# Patient Record
Sex: FEMALE | Race: WHITE | NOT HISPANIC OR LATINO
[De-identification: names, ages, dates, MRNs, and addresses within clinical notes are randomized per-mention and may not be internally consistent; named-entity substitution may affect disease eponyms.]

---

## 2022-03-22 PROBLEM — Z00.00 ENCOUNTER FOR PREVENTIVE HEALTH EXAMINATION: Status: ACTIVE | Noted: 2022-03-22

## 2022-03-24 ENCOUNTER — NON-APPOINTMENT (OUTPATIENT)
Age: 82
End: 2022-03-24

## 2022-03-25 ENCOUNTER — APPOINTMENT (OUTPATIENT)
Dept: HEART AND VASCULAR | Facility: CLINIC | Age: 82
End: 2022-03-25
Payer: MEDICARE

## 2022-03-25 ENCOUNTER — APPOINTMENT (OUTPATIENT)
Dept: HEART AND VASCULAR | Facility: CLINIC | Age: 82
End: 2022-03-25

## 2022-03-25 VITALS — SYSTOLIC BLOOD PRESSURE: 160 MMHG | DIASTOLIC BLOOD PRESSURE: 50 MMHG

## 2022-03-25 VITALS
HEIGHT: 64.5 IN | SYSTOLIC BLOOD PRESSURE: 200 MMHG | DIASTOLIC BLOOD PRESSURE: 90 MMHG | WEIGHT: 170 LBS | HEART RATE: 87 BPM | BODY MASS INDEX: 28.67 KG/M2 | TEMPERATURE: 97.6 F | OXYGEN SATURATION: 97 %

## 2022-03-25 DIAGNOSIS — N18.9 CHRONIC KIDNEY DISEASE, UNSPECIFIED: ICD-10-CM

## 2022-03-25 DIAGNOSIS — Z86.79 PERSONAL HISTORY OF OTHER DISEASES OF THE CIRCULATORY SYSTEM: ICD-10-CM

## 2022-03-25 DIAGNOSIS — M19.90 UNSPECIFIED OSTEOARTHRITIS, UNSPECIFIED SITE: ICD-10-CM

## 2022-03-25 DIAGNOSIS — Z84.1 FAMILY HISTORY OF DISORDERS OF KIDNEY AND URETER: ICD-10-CM

## 2022-03-25 DIAGNOSIS — Z87.891 PERSONAL HISTORY OF NICOTINE DEPENDENCE: ICD-10-CM

## 2022-03-25 DIAGNOSIS — R09.89 OTHER SPECIFIED SYMPTOMS AND SIGNS INVOLVING THE CIRCULATORY AND RESPIRATORY SYSTEMS: ICD-10-CM

## 2022-03-25 DIAGNOSIS — F41.9 ANXIETY DISORDER, UNSPECIFIED: ICD-10-CM

## 2022-03-25 DIAGNOSIS — Z82.49 FAMILY HISTORY OF ISCHEMIC HEART DISEASE AND OTHER DISEASES OF THE CIRCULATORY SYSTEM: ICD-10-CM

## 2022-03-25 DIAGNOSIS — Z72.89 OTHER PROBLEMS RELATED TO LIFESTYLE: ICD-10-CM

## 2022-03-25 DIAGNOSIS — Z78.9 OTHER SPECIFIED HEALTH STATUS: ICD-10-CM

## 2022-03-25 DIAGNOSIS — Z86.03 PERSONAL HISTORY OF NEOPLASM OF UNCERTAIN BEHAVIOR: ICD-10-CM

## 2022-03-25 PROCEDURE — 93306 TTE W/DOPPLER COMPLETE: CPT

## 2022-03-25 PROCEDURE — 99204 OFFICE O/P NEW MOD 45 MIN: CPT

## 2022-03-25 PROCEDURE — 93880 EXTRACRANIAL BILAT STUDY: CPT

## 2022-03-25 NOTE — PHYSICAL EXAM
[Normal Conjunctiva] : normal conjunctiva [No Edema] : no edema [Moves all extremities] : moves all extremities [Normal] : alert and oriented, normal memory [Carotid Bruit] : carotid bruit [de-identified] : left sided

## 2022-03-25 NOTE — HISTORY OF PRESENT ILLNESS
[FreeTextEntry1] : 82 year female who comes for cardiology evaluation. She is followed by a Nephrologist who heard a bruit and suggested a Carotid Doppler. She was followed by Cardiology in the past and has not been able to tolerate any statins. She recalls being on Zocor or Lipitor. She cannot tolerate Lovaza

## 2022-03-25 NOTE — ASSESSMENT
[FreeTextEntry1] : EKG-Patient recently had one and refused the study. Patient will request from PCP\par \par At the time of the patient's visit a Carotid Doppler was performed to evaluate for PVD. At the time of the visit the results were reviewed with patient \par \par At the time of the patient's visit an Echocardiogram was performed to evaluate LV function. At the time of the visit the results were reviewed with patient \par \par I suggested Preventive Cardiology consultation for possible PCSK9 inhibitor. We also discussed goal of BP< 130/80

## 2022-03-25 NOTE — REVIEW OF SYSTEMS
[Mouth Sores] : mouth sores [Lower Ext Edema] : lower extremity edema [Joint Pain] : joint pain [Anxiety] : anxiety [Negative] : Respiratory [Earache] : no earache [Discharge From Ears] : no discharge from the ears [Hearing Loss] : no hearing loss [Sore Throat] : no sore throat [Sinus Pressure] : no sinus pressure [Tinnitus] : no tinnitus [Vertigo] : no vertigo [SOB] : no shortness of breath [Dyspnea on exertion] : not dyspnea during exertion [Chest Discomfort] : no chest discomfort [Leg Claudication] : no intermittent leg claudication [Palpitations] : no palpitations [Orthopnea] : no orthopnea [PND] : no PND [Syncope] : no syncope [FreeTextEntry5] : occasional ankle swelling [FreeTextEntry9] : ankles and knees, fingers

## 2022-03-28 LAB
CHOLEST SERPL-MCNC: 217 MG/DL
HDLC SERPL-MCNC: 45 MG/DL
LDLC SERPL CALC-MCNC: 126 MG/DL
NONHDLC SERPL-MCNC: 172 MG/DL
TRIGL SERPL-MCNC: 227 MG/DL

## 2022-03-29 ENCOUNTER — NON-APPOINTMENT (OUTPATIENT)
Age: 82
End: 2022-03-29

## 2022-03-30 ENCOUNTER — APPOINTMENT (OUTPATIENT)
Dept: HEART AND VASCULAR | Facility: CLINIC | Age: 82
End: 2022-03-30
Payer: MEDICARE

## 2022-03-30 ENCOUNTER — LABORATORY RESULT (OUTPATIENT)
Age: 82
End: 2022-03-30

## 2022-03-30 ENCOUNTER — NON-APPOINTMENT (OUTPATIENT)
Age: 82
End: 2022-03-30

## 2022-03-30 VITALS — DIASTOLIC BLOOD PRESSURE: 78 MMHG | SYSTOLIC BLOOD PRESSURE: 194 MMHG

## 2022-03-30 VITALS
HEART RATE: 86 BPM | SYSTOLIC BLOOD PRESSURE: 219 MMHG | DIASTOLIC BLOOD PRESSURE: 77 MMHG | WEIGHT: 169.13 LBS | HEIGHT: 64.5 IN | TEMPERATURE: 97.6 F | OXYGEN SATURATION: 97 % | BODY MASS INDEX: 28.52 KG/M2

## 2022-03-30 VITALS — SYSTOLIC BLOOD PRESSURE: 170 MMHG | DIASTOLIC BLOOD PRESSURE: 68 MMHG

## 2022-03-30 PROCEDURE — 36415 COLL VENOUS BLD VENIPUNCTURE: CPT

## 2022-03-30 PROCEDURE — 99214 OFFICE O/P EST MOD 30 MIN: CPT | Mod: 25

## 2022-03-30 NOTE — DISCUSSION/SUMMARY
[FreeTextEntry1] : Ms. Cunningham is an 83yo W with HTN, HL, MGUS, carotid arterial disease who is referred for statin intolerance. \par \par HL:\par -has not tolerated multiple statins + ezetimibe due to muscle cramps\par -did not tolerate Lovaza\par -given moderate carotid plaque, LDL goal <70 -- given statin intolerant, discussed PSCK9i and she is agreeable to try (corrected LDL based on MH calculation is 135 mg/dL) \par -will submit for Repatha, can get education visit once authorized; will need lipid profile checked before 5th dose once starts \par -reviewed dietary modifications moises for elevated triglycerides -- including limiting added sugars and saturated fats -- will refer to nutritionist Maria E Pryor\par \par ASCVD risk reduction:\par -HTN: BP improved on repeat but still above goal -- per pt has lower #s at home and is following with nephrologist; she thinks related to anxiety and did not take clonazepam today\par -heart healthy dietary patterns -- nutrition referral\par -will check A1c to complete risk stratification\par \par She will continue follow-up with Dr. Ballard. Lipid profile can be checked before 5th dose to ensure goal LDL achieved. \par \par Pending pharmacy info

## 2022-03-30 NOTE — HISTORY OF PRESENT ILLNESS
[FreeTextEntry1] : Ms. Cunningham is an 81yo W with HTN, HL, MGUS, carotid arterial disease who is referred for statin intolerance. \par \par Currently feeling ok\par No chest pain\par No headache\par No vision changes\par BPs at home 150s/60s\par Recently started valsartan \par Thinks BP is linked to anxiety \par Did not take clonazepam today \par \par Denies any heart disease diagnosis\par \par HL:\par -20 yrs ago was diagnosed with HL\par -at that time tried different statins and got leg cramps: rosuvastatin, ezetimibe, atorvastatin she thinks as well\par -then was told that there was an effect on the liver\par 3/25/22 Lipids:\par Chol 217\par Trig 227\par HDL 45\par \par non-\par \par Outside labs reviewed\par Cr 1.09\par TSH 4.33\par \par Recently saw Dr. Ballard. Had carotid u/s showing moderate plaque on both carotid bifurcations/prox ICA and proximal ECA close to 50% stenosis\par \par PMH/PSH:\par as above\par \par FH:\par parents with HTN\par father with HL and CAD in his 80s\par \par Lifestyle History:\par Mediterranean Diet Score (9 question survey) was 6\par (8-9: optimal, 6-7: near-optimal, 4-5: suboptimal, 0-3: markedly suboptimal)\par Exercise: Patient reports exercising at a light level for  minutes per week. \par Smoking: Former smoker (1-2 PPD x 40 years; quit 15 years ago).\par Stress: Patient denies any stress. \par No etoh\par +Anxiety on clonazepam\par No snoring/no witnessed apnea episodes/no excessive daytime fatigue\par \par No PCOS\par +pregnancies\par no postpartum complications\par +miscarriages\par +menopause age 62

## 2022-03-30 NOTE — PHYSICAL EXAM
[Well Developed] : well developed [Well Nourished] : well nourished [No Acute Distress] : no acute distress [Normal Conjunctiva] : normal conjunctiva [Normal S1, S2] : normal S1, S2 [No Murmur] : no murmur [Clear Lung Fields] : clear lung fields [Good Air Entry] : good air entry [Soft] : abdomen soft [Non Tender] : non-tender [No Edema] : no edema [Moves all extremities] : moves all extremities [Alert and Oriented] : alert and oriented

## 2022-04-01 ENCOUNTER — NON-APPOINTMENT (OUTPATIENT)
Age: 82
End: 2022-04-01

## 2022-04-04 ENCOUNTER — APPOINTMENT (OUTPATIENT)
Dept: HEART AND VASCULAR | Facility: CLINIC | Age: 82
End: 2022-04-04
Payer: MEDICARE

## 2022-04-04 VITALS
SYSTOLIC BLOOD PRESSURE: 175 MMHG | HEIGHT: 64.5 IN | OXYGEN SATURATION: 97 % | WEIGHT: 169 LBS | BODY MASS INDEX: 28.5 KG/M2 | DIASTOLIC BLOOD PRESSURE: 75 MMHG | TEMPERATURE: 95 F | HEART RATE: 111 BPM

## 2022-04-04 VITALS — SYSTOLIC BLOOD PRESSURE: 142 MMHG | DIASTOLIC BLOOD PRESSURE: 70 MMHG

## 2022-04-04 PROCEDURE — 99214 OFFICE O/P EST MOD 30 MIN: CPT

## 2022-04-05 NOTE — HISTORY OF PRESENT ILLNESS
[FreeTextEntry1] : Ms. Cunningham is an 83yo W with HTN, HL, MGUS, carotid arterial disease who is referred for statin intolerance. She presents today for Repatha injection teaching. \par \par She reports feeling well today. Patient denies any chest pain, SOB, palpitations, orthopnea, PND or LE edema.\par \par \par -20 yrs ago was diagnosed with HLD and has tried several  different statins and Zetia over the years and reports myalgias on all agents making tolerance impossible: rosuvastatin, ezetimibe, atorvastatin\par \par 3/25/22 Lipids:\par Chol 217\par Trig 227\par HDL 45\par \par non-\par \par \par Carotid u/s showing moderate plaque on both carotid bifurcations/prox ICA and proximal ECA close to 50% stenosis

## 2022-04-05 NOTE — REASON FOR VISIT
[FreeTextEntry3] : Dr. Denis Ballard [FreeTextEntry1] : 81yo female with a PMHx of HTN, HL, MGUS, carotid arterial disease and statin intolerance presents today for follow up.

## 2022-04-05 NOTE — DISCUSSION/SUMMARY
[FreeTextEntry1] : 83yo female with a PMHx of HTN, HL, MGUS, carotid arterial disease and statin intolerance presents today for follow up. \par \par HLD, carotid stenosis, statin intolerance:\par - Patient counseled on the benefits and risks of PCSK9i therapy and instructed on injecting every two weeks. Informed of potential mild side effects of erythema and tenderness in injection site, possible mild nasopharyngitis, or rare more serious adverse events including immune reaction. \par - she feels unable to self-inject at this time, and will return to the office for her second injection in two weeks\par - will recheck lipids 7-10 days after third injection\par - discussed adding ASA 81mg daily back to her medication regimen now that she has established disease; she will consider once she knows she is tolerating Repatha well; she is concerned about increased bruising from the ASA and injections  \par \par ASCVD risk reduction:\par -HTN: BP improved on repeat but systolic still above goal -- she reports lower readings at home and is following with nephrologist; she thinks related to anxiety/white coat HTN\par -she reports nephrologist lowered her to Valsartan 40mg daily \par \par She will continue follow-up with Dr. Ballard.

## 2022-04-18 ENCOUNTER — APPOINTMENT (OUTPATIENT)
Dept: HEART AND VASCULAR | Facility: CLINIC | Age: 82
End: 2022-04-18
Payer: MEDICARE

## 2022-04-18 VITALS
TEMPERATURE: 97.1 F | DIASTOLIC BLOOD PRESSURE: 86 MMHG | SYSTOLIC BLOOD PRESSURE: 185 MMHG | WEIGHT: 169 LBS | HEIGHT: 64.5 IN | HEART RATE: 105 BPM | BODY MASS INDEX: 28.5 KG/M2

## 2022-04-18 VITALS — DIASTOLIC BLOOD PRESSURE: 72 MMHG | SYSTOLIC BLOOD PRESSURE: 142 MMHG

## 2022-04-18 PROCEDURE — 99214 OFFICE O/P EST MOD 30 MIN: CPT

## 2022-04-18 NOTE — HISTORY OF PRESENT ILLNESS
[FreeTextEntry1] : Ms. Cunningham is an 83yo W with HTN, HL, MGUS, carotid arterial disease who is referred for statin intolerance. She presents today for Repatha injection teaching. \par \par She reports feeling well today. Patient denies any chest pain, SOB, palpitations, orthopnea, PND or LE edema. She presents today for f/u after starting Repatha injections two weeks ago. She thinks she got a runny nose after the first injection, but otherwise no complaints. \par \par \par -20 yrs ago was diagnosed with HLD and has tried several  different statins and Zetia over the years and reports myalgias on all agents making tolerance impossible: rosuvastatin, ezetimibe, atorvastatin\par \par 3/25/22 Lipids:\par Chol 217\par Trig 227\par HDL 45\par \par non-\par \par \par Carotid u/s showing moderate plaque on both carotid bifurcations/prox ICA and proximal ECA close to 50% stenosis

## 2022-04-18 NOTE — DISCUSSION/SUMMARY
[FreeTextEntry1] : 83yo female with a PMHx of HTN, HL, MGUS, carotid arterial disease and statin intolerance presents today for follow up. \par \par HLD, carotid stenosis, statin intolerance:\par - Patient successfully self injected the Repatha under my supervision today; she feels able to handle them on her own moving forward \par - will recheck lipids 7-10 days after third injection; she prefers to RTC for this \par - discussed adding ASA 81mg daily back to her medication regimen now that she has established disease; she will consider once she knows she is tolerating Repatha well; she is concerned about increased bruising from the ASA and injections  \par \par ASCVD risk reduction:\par -HTN: BP improved on repeat but systolic still above goal -- she reports lower readings at home and is following with nephrologist; she thinks related to anxiety/white coat HTN\par -she reports nephrologist lowered her to Valsartan 40mg daily; she would prefer a second opinion as she does not feel her current provider is managing her condition well; will give referral for [facility] providers \par - she is interested in SGLT2i \par - she is going to get a new home BP monitor; will bring it to her f/u to calibrate in our office against manual reading \par - she is interested in working w/ nutritionist; will ask Maria E Pryor to call her for appt \par \par She will continue follow-up with Dr. Ballard. \par \par RTC May 9th for lipid check and f/u.

## 2022-05-09 ENCOUNTER — LABORATORY RESULT (OUTPATIENT)
Age: 82
End: 2022-05-09

## 2022-05-09 ENCOUNTER — APPOINTMENT (OUTPATIENT)
Dept: HEART AND VASCULAR | Facility: CLINIC | Age: 82
End: 2022-05-09
Payer: MEDICARE

## 2022-05-09 VITALS
WEIGHT: 170 LBS | TEMPERATURE: 97.8 F | OXYGEN SATURATION: 98 % | BODY MASS INDEX: 28.67 KG/M2 | HEIGHT: 64.5 IN | DIASTOLIC BLOOD PRESSURE: 78 MMHG | HEART RATE: 82 BPM | SYSTOLIC BLOOD PRESSURE: 196 MMHG

## 2022-05-09 VITALS — SYSTOLIC BLOOD PRESSURE: 140 MMHG | DIASTOLIC BLOOD PRESSURE: 70 MMHG

## 2022-05-09 DIAGNOSIS — R53.83 OTHER FATIGUE: ICD-10-CM

## 2022-05-09 PROCEDURE — 99214 OFFICE O/P EST MOD 30 MIN: CPT | Mod: 25

## 2022-05-09 PROCEDURE — 36415 COLL VENOUS BLD VENIPUNCTURE: CPT

## 2022-05-09 RX ORDER — NITROFURANTOIN (MONOHYDRATE/MACROCRYSTALS) 25; 75 MG/1; MG/1
100 CAPSULE ORAL
Qty: 28 | Refills: 0 | Status: DISCONTINUED | COMMUNITY
Start: 2022-01-10

## 2022-05-09 RX ORDER — PHENAZOPYRIDINE HYDROCHLORIDE 100 MG/1
100 TABLET ORAL
Qty: 30 | Refills: 0 | Status: DISCONTINUED | COMMUNITY
Start: 2022-01-05

## 2022-05-09 RX ORDER — CIPROFLOXACIN HYDROCHLORIDE 500 MG/1
500 TABLET, FILM COATED ORAL
Qty: 10 | Refills: 0 | Status: DISCONTINUED | COMMUNITY
Start: 2022-02-24

## 2022-05-09 NOTE — REASON FOR VISIT
[FreeTextEntry3] : Dr. Denis Ballard [FreeTextEntry1] : Ms. Cunningham is an 81yo W with HTN, HL, MGUS, carotid arterial disease who was originally referred for statin intolerance. She presents today for follow up after starting Repatha injections.

## 2022-05-09 NOTE — REVIEW OF SYSTEMS
[Negative] : Heme/Lymph [Feeling Fatigued] : feeling fatigued [FreeTextEntry4] : congestion; runny nose

## 2022-05-09 NOTE — DISCUSSION/SUMMARY
[FreeTextEntry1] : Ms. Cunningham is an 83yo W with HTN, HL, MGUS, carotid arterial disease who was originally referred for statin intolerance. She presents today for follow up after starting Repatha injections. \par \par HLD, carotid stenosis, statin intolerance:\par - Patient has been successfully self injected the Repatha every 2 weeks; however, she is now believing URI symptoms are from the medication\par - we feel her symptoms could be a virus or allergies; would prefer she stop the Repatha and reassess her symptoms; could consider a switch to Praluent, but pt is resistant at this time \par -- discussed options of Leqvio, but concerning based on her h/o sensitivity to medications; and bempedoic acid would not likely be covered w/ Medicare \par - will recheck lipids today \par - discussed adding ASA 81mg daily back to her medication regimen now that she has established disease\par \par Fatigue, possible URI:\par - she asked for multiple lab tests, but as she has been taking various doses of supplements, will check B12, folate and vitamin D today. \par - advised she f/u w/ PCP for the fatigue and respiratory symptoms (H&H WNL, WBC slightly elevated on most recent CBC from March) \par \par ASCVD risk reduction:\par -HTN: BP improved on repeat but systolic still above goal -- she reports lower readings at home and is following with nephrologist; she thinks related to anxiety/white coat HTN; she did not bring her home BP monitor to calibrate in our office today \par -she reports nephrologist lowered her to Valsartan 40mg daily, but she has continued to take the 80mg daily; she would prefer a second opinion as she does not feel her current provider is managing her condition well; will give referral for Dr. Aurelio Curiel \par - she is interested in SGLT2i \par - she is interested in working w/ nutritionist; will ask Maria E Pryor to call her for appt \par \par She will continue follow-up with Dr. Ballard. \par \par RTC in one month

## 2022-05-09 NOTE — HISTORY OF PRESENT ILLNESS
[FreeTextEntry1] : Ms. Cunningham is an 81yo W with HTN, HL, MGUS, carotid arterial disease who was originally referred for statin intolerance. She presents today for follow up after starting Repatha injections. \par \par She reports runny nose/congestion and feels it is due to the Repatha. Of note, she thinks she got a runny nose after the first injection, but otherwise no complaints. Her symptoms started about a week after the third injection. She is due for the next dose this Sunday. \par \par She asked about more "natural" ways to reduce her cholesterol. She has been taking various high doses of vitamins/supplements in the past, most recent vitamin D was check in March and WNL, but she has changed her regimen since then and is curious about her levels. \par \par Patient denies any chest pain, SOB, palpitations, orthopnea, PND or LE edema. \par \par She has not yet had an appt w/ Maria E Pryor (says no one called her) and she has not yet called our rec for nephrologist. \par \par \par -20 yrs ago was diagnosed with HLD and has tried several  different statins and Zetia over the years and reports myalgias on all agents making tolerance impossible: rosuvastatin, ezetimibe, atorvastatin\par \par 3/25/22 Lipids:\par Chol 217\par Trig 227\par HDL 45\par \par non-\par \par \par Carotid u/s showing moderate plaque on both carotid bifurcations/prox ICA and proximal ECA close to 50% stenosis

## 2022-05-18 ENCOUNTER — APPOINTMENT (OUTPATIENT)
Dept: CARDIOLOGY | Facility: CLINIC | Age: 82
End: 2022-05-18

## 2022-06-10 ENCOUNTER — APPOINTMENT (OUTPATIENT)
Dept: NEPHROLOGY | Facility: CLINIC | Age: 82
End: 2022-06-10

## 2022-06-12 ENCOUNTER — NON-APPOINTMENT (OUTPATIENT)
Age: 82
End: 2022-06-12

## 2022-06-24 ENCOUNTER — APPOINTMENT (OUTPATIENT)
Dept: HEART AND VASCULAR | Facility: CLINIC | Age: 82
End: 2022-06-24
Payer: MEDICARE

## 2022-06-24 PROCEDURE — 99213 OFFICE O/P EST LOW 20 MIN: CPT

## 2022-06-24 RX ORDER — EVOLOCUMAB 140 MG/ML
140 INJECTION, SOLUTION SUBCUTANEOUS
Qty: 1 | Refills: 3 | Status: DISCONTINUED | COMMUNITY
Start: 2022-03-30 | End: 2022-06-24

## 2022-06-24 NOTE — PHYSICAL EXAM
[Soft] : abdomen soft [Non Tender] : non-tender [No Edema] : no edema [Normal] : no rash, no skin lesions [Moves all extremities] : moves all extremities [Alert and Oriented] : alert and oriented

## 2022-06-26 NOTE — DISCUSSION/SUMMARY
[FreeTextEntry1] : Ms. Cunningham is an 83yo W with HTN, HL, MGUS, carotid arterial disease who was referred for statin intolerance and started on PCKS9i who now presents for follow-up. \par \par HLD/Carotid Stenosis/Statin Intolerance: \par - Last LDL well-controlled at 37 (05/2022) since starting Repatha, however pt. stopped the medication > one month ago d/t URI symptoms \par - Pt. in agreement to try Praluent 75 mg injection for one month for continued lipid control. Injection performed in clinic today. Pt. provided dose to self-inject at home in 2 weeks. \par - Will follow-up with pt. regarding Praluent tolerance \par - If pt. does not tolerate Praluent, will consider Leqvio in the future \par \par HTN: \par - Pt. refused BP check in clinic today \par - Pt. to follow-up with a Nephrologist at [facility] for further hypertensive work-up\par - Continue BP control w/ Valsartan 80 mg 0.5 tablet QD\par \par Will follow-up with pt. regarding tolerance to Praluent. Pt. to RTC in August for follow-up.\par \par \par Patient seen and case discussed with Ankita MEDINA. Pt refused BP, wants to follow with her nephrologist. PCSK9i switched from Repatha to Praluent, to follow-up response.

## 2022-06-26 NOTE — HISTORY OF PRESENT ILLNESS
[FreeTextEntry1] : Ms. Cunningham is an 83yo W with HTN, HL, MGUS, carotid arterial disease who was referred for statin intolerance and started on PCKS9i who now presents for follow-up. \par \par Last seen by Mi MEDINA in May 2022. At that time, had complaints of runny nose/congestion with the injection. Aspirin was discussed for carotid disease. Also referred to Dr. Curiel here. \par \par Since then:\par -Pt. has been off Repatha greater than one month due to URI symptoms \par - Pt. did not follow-up w/ Dr. Curiel. She will be following-up w/ a Nephrologist at [facility] in the near future\par - She is only able to walk 5 blocks at a time due to her right knee pain (she admits she is currently looking for an orthopedic surgeon for meniscal repair surgery) - No SOB or CP on exertion \par - Overall she denies any chest pain, dyspnea, orthopnea, PND, palpitations, lightheadedness, syncope or pedal edema \par \par HL:\par -20 yrs ago was diagnosed with HL\par -at that time tried different statins and got leg cramps: rosuvastatin, ezetimibe, atorvastatin she thinks as well\par -then was told that there was an effect on the liver\par 3/25/22 Lipids:\par Chol 217\par Trig 227\par HDL 45\par \par non-\par May 2022 Labs:\par Chol 114\par HDL 48\par Trig 144\par LDL 37\par \par Outside labs reviewed\par Cr 1.09\par TSH 4.33\par \par Was seeing Dr. Ballard -- had carotid u/s showing moderate plaque on both carotid bifurcations/prox ICA and proximal ECA close to 50% stenosis\par \par PMH/PSH:\par as above\par \par FH:\par parents with HTN\par father with HL and CAD in his 80s\par \par Lifestyle History:\par Mediterranean Diet Score (9 question survey) was 6\par (8-9: optimal, 6-7: near-optimal, 4-5: suboptimal, 0-3: markedly suboptimal)\par Exercise: Patient reports exercising at a light level for  minutes per week. \par Smoking: Former smoker (1-2 PPD x 40 years; quit 15 years ago).\par Stress: Patient denies any stress. \par No etoh\par +Anxiety on clonazepam\par No snoring/no witnessed apnea episodes/no excessive daytime fatigue\par \par No PCOS\par +pregnancies\par no postpartum complications\par +miscarriages\par +menopause age 62

## 2022-06-26 NOTE — REVIEW OF SYSTEMS
[Cough] : cough [Joint Pain] : joint pain [Fever] : no fever [Chills] : no chills [SOB] : no shortness of breath [Dyspnea on exertion] : not dyspnea during exertion [Chest Discomfort] : no chest discomfort [Lower Ext Edema] : no extremity edema [Leg Claudication] : no intermittent leg claudication [Palpitations] : no palpitations [Orthopnea] : no orthopnea [PND] : no PND [Syncope] : no syncope [Abdominal Pain] : no abdominal pain [Nausea] : no nausea [Rash] : no rash [Dizziness] : no dizziness [FreeTextEntry6] : Rhinorrhea

## 2022-07-29 ENCOUNTER — APPOINTMENT (OUTPATIENT)
Dept: HEART AND VASCULAR | Facility: CLINIC | Age: 82
End: 2022-07-29

## 2022-07-29 VITALS — DIASTOLIC BLOOD PRESSURE: 75 MMHG | SYSTOLIC BLOOD PRESSURE: 156 MMHG

## 2022-07-29 VITALS
WEIGHT: 175 LBS | DIASTOLIC BLOOD PRESSURE: 84 MMHG | HEIGHT: 64.5 IN | SYSTOLIC BLOOD PRESSURE: 204 MMHG | BODY MASS INDEX: 29.51 KG/M2 | TEMPERATURE: 97.4 F | HEART RATE: 100 BPM | OXYGEN SATURATION: 97 %

## 2022-07-29 PROCEDURE — 36415 COLL VENOUS BLD VENIPUNCTURE: CPT

## 2022-07-29 PROCEDURE — 99214 OFFICE O/P EST MOD 30 MIN: CPT | Mod: 25

## 2022-07-29 RX ORDER — VALSARTAN 80 MG/1
80 TABLET, COATED ORAL
Qty: 30 | Refills: 0 | Status: COMPLETED | COMMUNITY
End: 2022-07-22

## 2022-07-29 RX ORDER — CLONAZEPAM 0.5 MG/1
0.5 TABLET ORAL
Qty: 30 | Refills: 0 | Status: DISCONTINUED | COMMUNITY
End: 2022-07-29

## 2022-07-29 RX ORDER — CITALOPRAM HYDROBROMIDE 10 MG/1
10 TABLET, FILM COATED ORAL
Qty: 30 | Refills: 0 | Status: DISCONTINUED | COMMUNITY
Start: 2022-02-04 | End: 2022-07-29

## 2022-08-01 LAB
CHOLEST SERPL-MCNC: 173 MG/DL
HDLC SERPL-MCNC: 49 MG/DL
LDLC SERPL CALC-MCNC: 55 MG/DL
NONHDLC SERPL-MCNC: 124 MG/DL
TRIGL SERPL-MCNC: 341 MG/DL

## 2022-08-01 NOTE — DISCUSSION/SUMMARY
[FreeTextEntry1] : Ms. Cunningham is an 83yo W with HTN, HL, MGUS, carotid arterial disease who was referred for statin intolerance and started on PCKS9i who now presents for follow-up. \par \par HLD/Carotid Stenosis/Statin Intolerance: \par - c/w Praluent 75 mg injections (provided letter for her to travel)\par - recheck lipids today \par \par HTN: \par - continue follow-up with a Nephrologist at [facility] for further hypertensive work-up\par - Continue BP control w/ Valsartan 160 mg daily \par \par RTC 3 months for Dr. Reyna.

## 2022-08-31 RX ORDER — ALIROCUMAB 75 MG/ML
75 INJECTION, SOLUTION SUBCUTANEOUS
Qty: 1 | Refills: 3 | Status: DISCONTINUED | COMMUNITY
Start: 2022-06-29 | End: 2022-08-31

## 2022-08-31 RX ORDER — EVOLOCUMAB 140 MG/ML
140 INJECTION, SOLUTION SUBCUTANEOUS
Qty: 3 | Refills: 3 | Status: ACTIVE | COMMUNITY
Start: 2022-08-31 | End: 1900-01-01

## 2022-10-14 ENCOUNTER — NON-APPOINTMENT (OUTPATIENT)
Age: 82
End: 2022-10-14

## 2022-10-14 ENCOUNTER — APPOINTMENT (OUTPATIENT)
Dept: HEART AND VASCULAR | Facility: CLINIC | Age: 82
End: 2022-10-14

## 2022-10-14 VITALS
HEART RATE: 84 BPM | HEIGHT: 64.5 IN | OXYGEN SATURATION: 96 % | BODY MASS INDEX: 29.51 KG/M2 | SYSTOLIC BLOOD PRESSURE: 186 MMHG | WEIGHT: 175 LBS | DIASTOLIC BLOOD PRESSURE: 76 MMHG

## 2022-10-14 PROCEDURE — 93000 ELECTROCARDIOGRAM COMPLETE: CPT

## 2022-10-14 PROCEDURE — 99214 OFFICE O/P EST MOD 30 MIN: CPT

## 2022-10-16 NOTE — DISCUSSION/SUMMARY
[FreeTextEntry1] : Ms. Cunningham is an 83yo W with HTN, HL, MGUS, carotid arterial disease who was referred for statin intolerance and started on PCKS9i who now presents for follow-up. \par \par HL, carotid artery disease, ?PAD:\par -still with URI sx on Repatha so taking it every 3 weeks instead of 2\par -check lipid profile - pt will do on 10/17 as not fasting today\par -if LDL above goal on q3w Repatha, will need to either increase to q2w, try to see if insurance covers Praluent as unable to get to goal on Repatha, or try for inclisiran once offered at this office\par -obtain records re: PAD diagnosis, discuss with vascular if would benefit from cilostazol\par -continue aspirin 81mg\par \par HTN:\par -BPs have been elevated here, pt reports 130s/70s at home\par -continue following with nephrologist\par -per pt was referred for NORAH testing but not significant\par \par Follow-up 3 months or sooner as needed [EKG obtained to assist in diagnosis and management of assessed problem(s)] : EKG obtained to assist in diagnosis and management of assessed problem(s)

## 2022-10-16 NOTE — HISTORY OF PRESENT ILLNESS
[FreeTextEntry1] : Ms. Cunningham is an 81yo W with HTN, HL, MGUS, carotid arterial disease who was referred for statin intolerance and started on PCKS9i who now presents for follow-up. \par \par Since last visit:\par -saw nephrologist with adjustment in med regimen\par -saw geriatrician, referred to vascular and diagnosed with PAD (details unknown) - told to increase walking and comply with aspirin 81mg (had been discussed in past but she did not want to take)\par -no chest pain or shortness of breath\par \par HL: \par -initially on Repatha but then had URI symptoms; switched to Praluent with improvement in symptoms but too expensive. Thus now back on Repatha but taking it every 3 weeks\par -last dose was a week ago; taking every 3 weeks. has taken 3 doses so far\par -does not want to discuss dietary habits\par \par HTN:\par -follows with nephrologist\par -she thinks related to anxiety \par \par July 2022 Labs:\par Chol 173\par HDL 49\par \par LDL 55\par 3/25/22 Lipids:\par Chol 217\par Trig 227\par HDL 45\par \par non-\par May 2022 Labs:\par Chol 114\par HDL 48\par Trig 144\par LDL 37\par \par Was seeing Dr. Ballard -- had carotid u/s showing moderate plaque on both carotid bifurcations/prox ICA and proximal ECA close to 50% stenosis\par \par PMH/PSH:\par as above\par \par FH:\par parents with HTN\par father with HL and CAD in his 80s\par \par Lifestyle History:\par Mediterranean Diet Score (9 question survey) was 6\par (8-9: optimal, 6-7: near-optimal, 4-5: suboptimal, 0-3: markedly suboptimal)\par Exercise: Patient reports exercising at a light level for  minutes per week. \par Smoking: Former smoker (1-2 PPD x 40 years; quit 15 years ago).\par Stress: Patient denies any stress. \par No etoh\par +Anxiety on clonazepam\par No snoring/no witnessed apnea episodes/no excessive daytime fatigue\par \par No PCOS\par +pregnancies\par no postpartum complications\par +miscarriages\par +menopause age 62

## 2022-10-17 ENCOUNTER — APPOINTMENT (OUTPATIENT)
Dept: HEART AND VASCULAR | Facility: CLINIC | Age: 82
End: 2022-10-17

## 2022-10-17 PROCEDURE — 36415 COLL VENOUS BLD VENIPUNCTURE: CPT

## 2022-10-19 LAB
24R-OH-CALCIDIOL SERPL-MCNC: 31.3 PG/ML
CHOLEST SERPL-MCNC: 135 MG/DL
HDLC SERPL-MCNC: 45 MG/DL
LDLC SERPL CALC-MCNC: 36 MG/DL
NONHDLC SERPL-MCNC: 90 MG/DL
TRIGL SERPL-MCNC: 270 MG/DL

## 2022-12-05 ENCOUNTER — TRANSCRIPTION ENCOUNTER (OUTPATIENT)
Age: 82
End: 2022-12-05

## 2023-01-26 ENCOUNTER — APPOINTMENT (OUTPATIENT)
Dept: HEART AND VASCULAR | Facility: CLINIC | Age: 83
End: 2023-01-26
Payer: MEDICARE

## 2023-01-26 VITALS
WEIGHT: 178 LBS | TEMPERATURE: 98.5 F | BODY MASS INDEX: 30.02 KG/M2 | HEART RATE: 92 BPM | HEIGHT: 64.5 IN | OXYGEN SATURATION: 97 % | SYSTOLIC BLOOD PRESSURE: 208 MMHG | DIASTOLIC BLOOD PRESSURE: 78 MMHG

## 2023-01-26 VITALS — SYSTOLIC BLOOD PRESSURE: 170 MMHG | DIASTOLIC BLOOD PRESSURE: 80 MMHG

## 2023-01-26 DIAGNOSIS — R93.89 ABNORMAL FINDINGS ON DIAGNOSTIC IMAGING OF OTHER SPECIFIED BODY STRUCTURES: ICD-10-CM

## 2023-01-26 PROCEDURE — 99214 OFFICE O/P EST MOD 30 MIN: CPT | Mod: 25

## 2023-01-26 PROCEDURE — 36415 COLL VENOUS BLD VENIPUNCTURE: CPT

## 2023-01-26 RX ORDER — ASCORBIC ACID 500 MG
TABLET ORAL DAILY
Refills: 0 | Status: ACTIVE | COMMUNITY

## 2023-01-26 RX ORDER — VALACYCLOVIR 1 G/1
1 TABLET, FILM COATED ORAL
Refills: 0 | Status: ACTIVE | COMMUNITY

## 2023-01-26 RX ORDER — CANDESARTAN CILEXETIL 32 MG/1
32 TABLET ORAL DAILY
Refills: 0 | Status: ACTIVE | COMMUNITY

## 2023-01-26 RX ORDER — ASPIRIN ENTERIC COATED TABLETS 81 MG 81 MG/1
81 TABLET, DELAYED RELEASE ORAL
Refills: 0 | Status: ACTIVE | COMMUNITY

## 2023-01-26 NOTE — DISCUSSION/SUMMARY
[FreeTextEntry1] : Ms. Cunningham is an 83yo W with HTN, HL, MGUS, carotid arterial disease who was referred for statin intolerance and started on PCKS9i who now presents for follow-up. \par \par HL, carotid artery disease, PAD:\par - plaque noted in thoracic aorta, LM and abdominal aorta on recent CT chest noncontrast \par -still with URI sx on Repatha so taking it every 3 weeks instead of 2\par -LDL 36 on labs from 10/22\par -inclisiran discussed but pt prefers to avoid "new" drugs; will defer inclisiran for now and c/w Repatha \par -continue aspirin 81mg\par - CT chest mentions mild LM plaque but not optimal -- discussed CCTA cor for better assessment pt is very concerned over renal function w/ contrast CT; will pursue stress echo at this time to further assess for ischemia \par - will repeat fasting lipids today \par \par HTN:\par -BPs have been elevated here, pt reports 130s-150s/70s at home\par -continue following with nephrologist; reports her candesartan was increased from 24mg to 32mg based on proteinuria at most recent visit and so will not make any change to that now -- recommend close follow-up with her nephrologist \par \par Will decide on next steps after reviewing lipid panel. Likely follow-up in 3 months.

## 2023-01-26 NOTE — HISTORY OF PRESENT ILLNESS
[FreeTextEntry1] : Ms. Cunningham is an 81yo W with HTN, HL, MGUS, carotid arterial disease who was referred for statin intolerance and started on PCKS9i who now presents for follow-up. \par \par Last seen in Oct 2022. At that time, had been seen by vascular at [facility] for claudication, recommended for walking program and follow-up in 3 months. Lipid profile checked. Possible candidate for inclisiran. Dietary modifications recommended for elevated TG. \par \par Since last visit: She had CT chest at [facility] 1/4/23 to evaluate her lungs, which revealed moderate mixed calcified and noncalcified thoracic aortic plaque and mild calcified arch vessel and LM coronary artery plaque; hepatic steatosis and relatively severe abdominal aortic plaque. \par \par She does report SOB related to her emphysema. Patient denies any chest pain, palpitations, orthopnea, PND or LE edema.\par \par Nephrologist  - Dr. Lubna Pinto? \par PCP - Dr. Mimi Mercedes; fax: [phone]\par \par HL: \par -initially on Repatha but then had URI symptoms; switched to Praluent with improvement in symptoms but too expensive. Thus now back on Repatha but taking it every 3 weeks. She did stop if for 6 weeks and says she felt much better - no URI symptoms and says her legs felt better. She restarted about 3 weeks ago. \par \par HTN:\par -follows with nephrologist\par -she thinks related to anxiety \par \par Was seeing Dr. Ballard -- had carotid u/s showing moderate plaque on both carotid bifurcations/prox ICA and proximal ECA close to 50% stenosis. She reports compliance w/ daily ASA 81mg. \par \par PMH/PSH:\par as above\par \par FH:\par parents with HTN\par father with HL and CAD in his 80s\par \par Lifestyle History:\par Mediterranean Diet Score (9 question survey) was 6\par (8-9: optimal, 6-7: near-optimal, 4-5: suboptimal, 0-3: markedly suboptimal)\par Exercise: Patient reports exercising at a light level for  minutes per week. \par Smoking: Former smoker (1-2 PPD x 40 years; quit 15 years ago).\par Stress: Patient denies any stress. \par No etoh\par +Anxiety on clonazepam\par No snoring/no witnessed apnea episodes/no excessive daytime fatigue\par \par No PCOS\par +pregnancies\par no postpartum complications\par +miscarriages\par +menopause age 62\par \par Labs from 1/23:\par Cr 0.9\par eGFR 60\par Oct 2022 Labs:\par Chol 135\par HDL 45\par \par LDL 36

## 2023-01-27 LAB
CHOLEST SERPL-MCNC: 180 MG/DL
HDLC SERPL-MCNC: 46 MG/DL
LDLC SERPL CALC-MCNC: 84 MG/DL
NONHDLC SERPL-MCNC: 134 MG/DL
TRIGL SERPL-MCNC: 253 MG/DL

## 2023-02-23 ENCOUNTER — APPOINTMENT (OUTPATIENT)
Dept: HEART AND VASCULAR | Facility: CLINIC | Age: 83
End: 2023-02-23

## 2023-02-23 ENCOUNTER — APPOINTMENT (OUTPATIENT)
Dept: HEART AND VASCULAR | Facility: CLINIC | Age: 83
End: 2023-02-23
Payer: MEDICARE

## 2023-02-23 VITALS
HEART RATE: 92 BPM | SYSTOLIC BLOOD PRESSURE: 210 MMHG | TEMPERATURE: 97.7 F | WEIGHT: 180 LBS | DIASTOLIC BLOOD PRESSURE: 80 MMHG | BODY MASS INDEX: 29.99 KG/M2 | HEIGHT: 65 IN | OXYGEN SATURATION: 98 %

## 2023-02-23 VITALS — DIASTOLIC BLOOD PRESSURE: 65 MMHG | SYSTOLIC BLOOD PRESSURE: 150 MMHG

## 2023-02-23 DIAGNOSIS — I10 ESSENTIAL (PRIMARY) HYPERTENSION: ICD-10-CM

## 2023-02-23 DIAGNOSIS — I65.29 OCCLUSION AND STENOSIS OF UNSPECIFIED CAROTID ARTERY: ICD-10-CM

## 2023-02-23 DIAGNOSIS — E78.5 HYPERLIPIDEMIA, UNSPECIFIED: ICD-10-CM

## 2023-02-23 PROCEDURE — 99214 OFFICE O/P EST MOD 30 MIN: CPT

## 2023-02-23 RX ORDER — AMLODIPINE BESYLATE 5 MG/1
5 TABLET ORAL DAILY
Qty: 90 | Refills: 3 | Status: ACTIVE | COMMUNITY
Start: 2023-02-23 | End: 1900-01-01

## 2023-02-24 PROBLEM — I65.29 CAROTID ARTERY PLAQUE: Status: ACTIVE | Noted: 2022-03-25

## 2023-02-24 PROBLEM — I10 HYPERTENSION: Status: ACTIVE | Noted: 2022-03-25

## 2023-02-24 PROBLEM — E78.5 HYPERLIPIDEMIA: Status: ACTIVE | Noted: 2022-03-25

## 2023-02-24 NOTE — DISCUSSION/SUMMARY
[FreeTextEntry1] : Martha Cunningham is an 82yo W with HTN, HL, MGUS, carotid arterial disease who was referred for statin intolerance and started on PCKS9i who now presents for follow-up. \par \par Abnormal CT scan:\par - plaque noted in thoracic aorta, LM and abdominal aorta on recent CT chest noncontrast \par - seen by vascular at [facility] Dr. Rawls. Was diagnosed with claudication - recommended for walking program. Unclear if pt could adequately exercise for the stress echo; will reschedule for pharm NUC\par \par HTN: elevated today and on home monitor \par - add amlodipine 5mg qd \par - continue ARB\par \par HLD:\par -LDL now 84, TG remain elevated\par -inclisiran discussed but pt prefers to avoid "new" drugs; will defer inclisiran for now and c/w Repatha (injection given in office today as she has been out of the medication); recheck lipid profile on consistent Repatha\par -continue aspirin 81mg\par - extensive discussion on diet and modifications to help lower TG, still has dietary improvements with added sugar and saturated fat that she can make and is willing to make (does not want nutritionist referral), if TG remain elevated will start Vascepa\par \par Will f/u after stress test as above for next steps. F/u 1 month for BP.

## 2023-02-24 NOTE — CARDIOLOGY SUMMARY
[de-identified] : \par 3/30/22 EKG: SR, baseline artifact, 1avb \par 10/14/22 EKG: NSR\par 2/23/23 EKG:

## 2023-02-24 NOTE — HISTORY OF PRESENT ILLNESS
[FreeTextEntry1] : Martha Cunningham is an 84yo W with HTN, HL, MGUS, carotid arterial disease who was referred for statin intolerance and started on PCKS9i who now presents for follow-up. \par \par Last seen in Jan 2023. At that time, had imaging at [place] showing aortic plaque. Advised to discussed with vascular. Inclisiran discussed given side effects to Repatha but she wants to hold off for now. Fasting lipids repeated. CT chest also showed mild LM plaque but not optimal -- discussed CCTA but she wanted to avoid contrast, stress echo ordered. Following with nephrologist for hypertension. \par \par Since then:\par -labs show LDL 84, \par - she was taking the Repatha every 4 weeks; still reporting URI symptoms; had to pay her deductible at the beginning of the year, so has not taken a shot in the past 4 weeks; can't  the drug till Saturday\par - she admits to a poor diet; lots of refined carbs and sugars \par - not very active \par \par Nephrologist  - Dr. Lubna Pinto? \par PCP - Dr. Mimi Mercedes; fax: [phone]\par \par HL: \par -initially on Repatha but then had URI symptoms; switched to Praluent with improvement in symptoms but too expensive. Thus now back on Repatha but taking it every 3 weeks. She did stop if for 6 weeks and says she felt much better - no URI symptoms and says her legs felt better.\par \par HTN:\par -follows with nephrologist\par -she thinks related to anxiety \par \par Was seeing Dr. Ballard -- had carotid u/s showing moderate plaque on both carotid bifurcations/prox ICA and proximal ECA close to 50% stenosis. She reports compliance w/ daily ASA 81mg. \par \par PMH/PSH:\par as above\par \par FH:\par parents with HTN\par father with HL and CAD in his 80s\par \par Lifestyle History:\par Mediterranean Diet Score (9 question survey) was 6\par (8-9: optimal, 6-7: near-optimal, 4-5: suboptimal, 0-3: markedly suboptimal)\par Exercise: Patient reports exercising at a light level for  minutes per week. \par Smoking: Former smoker (1-2 PPD x 40 years; quit 15 years ago).\par Stress: Patient denies any stress. \par No etoh\par +Anxiety on clonazepam\par No snoring/no witnessed apnea episodes/no excessive daytime fatigue\par \par No PCOS\par +pregnancies\par no postpartum complications\par +miscarriages\par +menopause age 62\par \par Labs from 1/23:\par Cr 0.9\par eGFR 60\par Oct 2022 Labs:\par Chol 135\par HDL 45\par \par LDL 36

## 2023-02-24 NOTE — PHYSICAL EXAM
[Normal] : normal S1, S2, no murmur, no rub, no gallop [Normal S1, S2] : normal S1, S2 [No Murmur] : no murmur [Clear Lung Fields] : clear lung fields [Good Air Entry] : good air entry [No Respiratory Distress] : no respiratory distress  [No Edema] : no edema [Moves all extremities] : moves all extremities [Alert and Oriented] : alert and oriented

## 2023-04-03 ENCOUNTER — APPOINTMENT (OUTPATIENT)
Dept: HEART AND VASCULAR | Facility: CLINIC | Age: 83
End: 2023-04-03
Payer: MEDICARE

## 2023-04-03 PROCEDURE — 99442: CPT | Mod: 95
